# Patient Record
Sex: FEMALE | Race: BLACK OR AFRICAN AMERICAN
[De-identification: names, ages, dates, MRNs, and addresses within clinical notes are randomized per-mention and may not be internally consistent; named-entity substitution may affect disease eponyms.]

---

## 2017-08-30 ENCOUNTER — HOSPITAL ENCOUNTER (EMERGENCY)
Dept: HOSPITAL 62 - ER | Age: 42
Discharge: HOME | End: 2017-08-30
Payer: SELF-PAY

## 2017-08-30 DIAGNOSIS — R10.30: ICD-10-CM

## 2017-08-30 DIAGNOSIS — Z3A.01: ICD-10-CM

## 2017-08-30 DIAGNOSIS — F17.200: ICD-10-CM

## 2017-08-30 DIAGNOSIS — O46.91: Primary | ICD-10-CM

## 2017-08-30 LAB
ALBUMIN SERPL-MCNC: 4.1 G/DL (ref 3.5–5)
ALP SERPL-CCNC: 68 U/L (ref 38–126)
ALT SERPL-CCNC: 25 U/L (ref 9–52)
ANION GAP SERPL CALC-SCNC: 9 MMOL/L (ref 5–19)
APPEARANCE UR: (no result)
AST SERPL-CCNC: 18 U/L (ref 14–36)
BASOPHILS # BLD AUTO: 0.1 10^3/UL (ref 0–0.2)
BASOPHILS NFR BLD AUTO: 0.9 % (ref 0–2)
BILIRUB DIRECT SERPL-MCNC: 0.3 MG/DL (ref 0–0.4)
BILIRUB SERPL-MCNC: 0.5 MG/DL (ref 0.2–1.3)
BILIRUB UR QL STRIP: NEGATIVE
BUN SERPL-MCNC: 11 MG/DL (ref 7–20)
CALCIUM: 10.3 MG/DL (ref 8.4–10.2)
CHLORIDE SERPL-SCNC: 104 MMOL/L (ref 98–107)
CO2 SERPL-SCNC: 25 MMOL/L (ref 22–30)
CREAT SERPL-MCNC: 0.98 MG/DL (ref 0.52–1.25)
EOSINOPHIL # BLD AUTO: 0.2 10^3/UL (ref 0–0.6)
EOSINOPHIL NFR BLD AUTO: 3 % (ref 0–6)
ERYTHROCYTE [DISTWIDTH] IN BLOOD BY AUTOMATED COUNT: 15.1 % (ref 11.5–14)
GLUCOSE SERPL-MCNC: 82 MG/DL (ref 75–110)
GLUCOSE UR STRIP-MCNC: NEGATIVE MG/DL
HCT VFR BLD CALC: 38.6 % (ref 36–47)
HGB BLD-MCNC: 12.8 G/DL (ref 12–15.5)
HGB HCT DIFFERENCE: -0.2
KETONES UR STRIP-MCNC: NEGATIVE MG/DL
LYMPHOCYTES # BLD AUTO: 1.9 10^3/UL (ref 0.5–4.7)
LYMPHOCYTES NFR BLD AUTO: 28.7 % (ref 13–45)
MCH RBC QN AUTO: 28.3 PG (ref 27–33.4)
MCHC RBC AUTO-ENTMCNC: 33.1 G/DL (ref 32–36)
MCV RBC AUTO: 86 FL (ref 80–97)
MONOCYTES # BLD AUTO: 0.5 10^3/UL (ref 0.1–1.4)
MONOCYTES NFR BLD AUTO: 7.6 % (ref 3–13)
NEUTROPHILS # BLD AUTO: 3.9 10^3/UL (ref 1.7–8.2)
NEUTS SEG NFR BLD AUTO: 59.8 % (ref 42–78)
NITRITE UR QL STRIP: NEGATIVE
PH UR STRIP: 5 [PH] (ref 5–9)
POTASSIUM SERPL-SCNC: 4.8 MMOL/L (ref 3.6–5)
PROT SERPL-MCNC: 6.8 G/DL (ref 6.3–8.2)
PROT UR STRIP-MCNC: 30 MG/DL
RBC # BLD AUTO: 4.51 10^6/UL (ref 3.72–5.28)
SODIUM SERPL-SCNC: 138.2 MMOL/L (ref 137–145)
SP GR UR STRIP: 1.01
UROBILINOGEN UR-MCNC: NEGATIVE MG/DL (ref ?–2)
WBC # BLD AUTO: 6.5 10^3/UL (ref 4–10.5)

## 2017-08-30 PROCEDURE — 86900 BLOOD TYPING SEROLOGIC ABO: CPT

## 2017-08-30 PROCEDURE — 76817 TRANSVAGINAL US OBSTETRIC: CPT

## 2017-08-30 PROCEDURE — 81001 URINALYSIS AUTO W/SCOPE: CPT

## 2017-08-30 PROCEDURE — 84702 CHORIONIC GONADOTROPIN TEST: CPT

## 2017-08-30 PROCEDURE — 36415 COLL VENOUS BLD VENIPUNCTURE: CPT

## 2017-08-30 PROCEDURE — 99284 EMERGENCY DEPT VISIT MOD MDM: CPT

## 2017-08-30 PROCEDURE — 85025 COMPLETE CBC W/AUTO DIFF WBC: CPT

## 2017-08-30 PROCEDURE — 80053 COMPREHEN METABOLIC PANEL: CPT

## 2017-08-30 PROCEDURE — 86901 BLOOD TYPING SEROLOGIC RH(D): CPT

## 2017-08-30 NOTE — ER DOCUMENT REPORT
ED General





- General


Chief Complaint: Vag Bleeding, +preg <12wks


Stated Complaint: VAGINAL BLEEDING


Time Seen by Provider: 17 19:07


Notes: 





Patient is a 41-year-old female with a past medical history  who presents 

with mild lower abdominal cramping and vaginal spotting.  Patient states her 

last menstrual period was at the end of July and she believes she is 

approximately 9 weeks pregnant.  She has not had a ultrasound during this 

pregnancy.  She did have an elective  during her first pregnancy when 

she was 18 years of age and has not been pregnant since that time.  She does 

note a very mild, dull, intermittent, cramping pain to her lower abdomen.  

Nothing improves or worsens his pain.  She has not seen her primary doctor 

regarding today's concerns.  No prior history of these symptoms in this 

pregnancy.  She has not bled through a single pad since she started bleeding 

today.


TRAVEL OUTSIDE OF THE [place] IN LAST 30 DAYS: No





- Related Data


Allergies/Adverse Reactions: 


 





No Known Allergies Allergy (Verified 17 19:01)


 











Past Medical History





- General


Information source: Patient





- Social History


Smoking Status: Current Every Day Smoker


Frequency of alcohol use: None


Drug Abuse: None


Family History: Reviewed & Not Pertinent


Patient has suicidal ideation: No


Patient has homicidal ideation: No


Renal/ Medical History: Denies: Hx Peritoneal Dialysis





- Immunizations


Hx Diphtheria, Pertussis, Tetanus Vaccination: Yes





Review of Systems





- Review of Systems


Notes: 





Constitutional: Negative for fever.


HENT: Negative for sore throat.


Eyes: Negative for visual changes.


Cardiovascular: Negative for chest pain.


Respiratory: Negative for shortness of breath.


Gastrointestinal: positive for mild abdominal cramping


Genitourinary: Negative for dysuria.  Positive for mild vaginal bleeding


Musculoskeletal: Negative for back pain.


Skin: Negative for rash.


Neurological: Negative for headaches, weakness or numbness.





10 point ROS negative except as marked above and in HPI.





Physical Exam





- Vital signs


Vitals: 


 











Temp Pulse Resp BP Pulse Ox


 


 99.2 F   67   20   137/88 H  100 


 


 17 18:58  17 18:58  17 18:58  17 18:58  17 18:58











Interpretation: Normal


Notes: 





PHYSICAL EXAMINATION:





GENERAL: Well-appearing, well-nourished and in no acute distress.





HEAD: Atraumatic, normocephalic.





EYES: Pupils equal round and reactive to light, extraocular movements intact, 

sclera anicteric, conjunctiva are normal.





ENT: nares patent, oropharynx clear without exudates.  Moist mucous membranes.





NECK: Normal range of motion, supple without lymphadenopathy





LUNGS: Breath sounds clear to auscultation bilaterally and equal.  No wheezes 

rales or rhonchi.





HEART: Regular rate and rhythm without murmurs





ABDOMEN: Soft, nontender, normoactive bowel sounds.  No guarding, no rebound.  

No masses appreciated.





EXTREMITIES: Normal range of motion, no pitting or edema.  No cyanosis.





NEUROLOGICAL: No focal neurological deficits. Moves all extremities 

spontaneously and on command.





PSYCH: Normal mood, normal affect.





SKIN: Warm, Dry, normal turgor, no rashes or lesions noted.





Course





- Re-evaluation


Re-evalutation: 





17 22:40


Patient presents with a mild amount of vaginal bleeding in the setting of an 

early first trimester pregnancy.  Transvaginal ultrasound is unable to 

visualize an intrauterine pregnancy at this time.  Quantitative beta hCG below 

the zone of to margination.  No active bleeding at time of presentation.  She 

is Rh positive.  Patient's abdominal exam is otherwise benign without any focal 

tenderness.  I do not suspect an acute appendicitis, pyelonephritis, cystitis, 

or bowel obstruction.  At this time I have informed the patient that she needs 

to return to the emergency department or the women's clinic in 48 hours for 

recheck of her quantitative beta hCG to assess whether or not this is a normal 

pregnancy, spontaneous , or a possible ectopic pregnancy. At this time 

will discharge with return precautions and follow-up recommendations.  Verbal 

discharge instructions given a the bedside and opportunity for questions given. 

Medication warnings reviewed. Patient is in agreement with this plan and has 

verbalized understanding of return precautions and the need for primary care 

follow-up in the next 24-72 hours.








- Vital Signs


Vital signs: 


 











Temp Pulse Resp BP Pulse Ox


 


 97.4 F   66   16   116/74   99 


 


 17 00:06  17 00:06  17 00:06  17 00:06  17 00:06














- Laboratory


Result Diagrams: 


 17 19:15





 17 19:15


Laboratory results interpreted by me: 


 











  17





  19:15 19:15 19:15


 


RDW  15.1 H  


 


Calcium   10.3 H 


 


Beta HCG, Quant   81.47 H 


 


Urine Protein    30 H


 


Urine Blood    LARGE H


 


Ur Leukocyte Esterase    MODERATE H


 


Urine Ascorbic Acid    20 H














- Diagnostic Test


Radiology reviewed: Reports reviewed





Discharge





- Discharge


Clinical Impression: 


 Pregnancy of unknown anatomic location, Hemorrhage, pregnancy, early





Condition: Good


Disposition: HOME, SELF-CARE


Additional Instructions: 


You need to return to the ED or the women's health clinic in 48 hours for a 

recheck of your pregnancy hormone level. The ultrasound is unable to see 

anything at this time because you are too early in your pregnancy. Please 

return if you develop severe abdominal pain, bleeding that goes through more 

than 2 pads for more than 2 hours, pass out, or have any other symptoms that 

are concerning to you. Please follow-up closely with your OBGYN regarding 

todays visit.


Forms:  Return to Work

## 2017-08-30 NOTE — RADIOLOGY REPORT (SQ)
EXAM DESCRIPTION:  U/S OB TRANSVAGINAL W/O DOP



COMPLETED DATE/TIME:  2017 9:17 pm



REASON FOR STUDY:  preg/bleeding



COMPARISON:  None.



TECHNIQUE:  Transabdominal static and realtime grayscale images acquired of the pelvis. Additional se
lected spectral and color Doppler images recorded. All images stored on PACs.

bHC



LIMITATIONS:  None.



FINDINGS:  No IUP visualized.

UTERUS: Multiple fibroids limit evaluation of the endometrium, largest in the fundus measuring 10 cm.


CERVICAL LENGTH: 3.4 cm   Closed.

RIGHT ADNEXA: Normal ovary with normal vascular flow.

No adnexal free fluid.

No adnexal masses.

LEFT ADNEXA: Ovary not identified.

No adnexal free fluid.

No adnexal masses.

FREE FLUID: None.

OTHER: No other significant finding.



IMPRESSION:  No IUP visualized.

Multiple fibroids limit evaluation of the endometrium, largest in the fundus measuring 10 cm.  Nonvis
ualized left ovary.



TECHNICAL DOCUMENTATION:  JOB ID:  [id number]

  [employer]- All Rights Reserved

## 2017-08-30 NOTE — ER DOCUMENT REPORT
ED Medical Screen (RME)





- General


Chief Complaint: Vag Bleeding, +preg <12wks


Stated Complaint: VAGINAL BLEEDING


Time Seen by Provider: 17 19:07


Notes: 





Patient states she is approximately 9 weeks pregnant.  She had a positive 

pregnancy test at the local health clinic.  She has not had an ultrasound yet.  

She states today she started with some spotting this morning and throughout the 

day she has had increased bleeding and cramping.  No clots.  She is a .


TRAVEL OUTSIDE OF THE [place] IN LAST 30 DAYS: No





- Related Data


Allergies/Adverse Reactions: 


 





No Known Allergies Allergy (Verified 17 19:01)


 











Past Medical History





- Social History


Frequency of alcohol use: None


Drug Abuse: None


Renal/ Medical History: Denies: Hx Peritoneal Dialysis





- Immunizations


Hx Diphtheria, Pertussis, Tetanus Vaccination: Yes





Physical Exam





- Vital signs


Vitals: 





 











Temp Pulse Resp BP Pulse Ox


 


 99.2 F   67   20   137/88 H  100 


 


 17 18:58  17 18:58  17 18:58  17 18:58  17 18:58














Course





- Vital Signs


Vital signs: 





 











Temp Pulse Resp BP Pulse Ox


 


 99.2 F   67   20   137/88 H  100 


 


 17 18:58  17 18:58  17 18:58  17 18:58  17 18:58

## 2017-08-31 VITALS — SYSTOLIC BLOOD PRESSURE: 116 MMHG | DIASTOLIC BLOOD PRESSURE: 74 MMHG

## 2018-08-23 ENCOUNTER — HOSPITAL ENCOUNTER (EMERGENCY)
Dept: HOSPITAL 62 - ER | Age: 43
Discharge: LEFT BEFORE DISCHARGE | End: 2018-08-23
Payer: MEDICAID

## 2018-08-23 VITALS — SYSTOLIC BLOOD PRESSURE: 136 MMHG | DIASTOLIC BLOOD PRESSURE: 71 MMHG

## 2018-08-23 DIAGNOSIS — Z53.21: Primary | ICD-10-CM

## 2018-08-24 ENCOUNTER — HOSPITAL ENCOUNTER (EMERGENCY)
Dept: HOSPITAL 62 - ER | Age: 43
Discharge: HOME | End: 2018-08-24
Payer: MEDICAID

## 2018-08-24 VITALS — DIASTOLIC BLOOD PRESSURE: 81 MMHG | SYSTOLIC BLOOD PRESSURE: 128 MMHG

## 2018-08-24 DIAGNOSIS — O34.12: Primary | ICD-10-CM

## 2018-08-24 DIAGNOSIS — D25.9: ICD-10-CM

## 2018-08-24 DIAGNOSIS — O26.892: ICD-10-CM

## 2018-08-24 DIAGNOSIS — R11.0: ICD-10-CM

## 2018-08-24 DIAGNOSIS — Z3A.17: ICD-10-CM

## 2018-08-24 DIAGNOSIS — O99.332: ICD-10-CM

## 2018-08-24 DIAGNOSIS — R10.31: ICD-10-CM

## 2018-08-24 LAB
ADD MANUAL DIFF: NO
ALBUMIN SERPL-MCNC: 3.8 G/DL (ref 3.5–5)
ALP SERPL-CCNC: 84 U/L (ref 38–126)
ALT SERPL-CCNC: 26 U/L (ref 9–52)
ANION GAP SERPL CALC-SCNC: 8 MMOL/L (ref 5–19)
APPEARANCE UR: (no result)
APTT PPP: YELLOW S
AST SERPL-CCNC: 39 U/L (ref 14–36)
BASOPHILS # BLD AUTO: 0.1 10^3/UL (ref 0–0.2)
BASOPHILS NFR BLD AUTO: 0.5 % (ref 0–2)
BILIRUB DIRECT SERPL-MCNC: 0.3 MG/DL (ref 0–0.4)
BILIRUB SERPL-MCNC: 0.5 MG/DL (ref 0.2–1.3)
BILIRUB UR QL STRIP: NEGATIVE
BUN SERPL-MCNC: 8 MG/DL (ref 7–20)
CALCIUM: 10.9 MG/DL (ref 8.4–10.2)
CHLORIDE SERPL-SCNC: 105 MMOL/L (ref 98–107)
CO2 SERPL-SCNC: 24 MMOL/L (ref 22–30)
EOSINOPHIL # BLD AUTO: 0.1 10^3/UL (ref 0–0.6)
EOSINOPHIL NFR BLD AUTO: 0.9 % (ref 0–6)
ERYTHROCYTE [DISTWIDTH] IN BLOOD BY AUTOMATED COUNT: 15 % (ref 11.5–14)
GLUCOSE SERPL-MCNC: 90 MG/DL (ref 75–110)
GLUCOSE UR STRIP-MCNC: NEGATIVE MG/DL
HCT VFR BLD CALC: 32.8 % (ref 36–47)
HGB BLD-MCNC: 11.1 G/DL (ref 12–15.5)
KETONES UR STRIP-MCNC: 20 MG/DL
LIPASE SERPL-CCNC: 35.3 U/L (ref 23–300)
LYMPHOCYTES # BLD AUTO: 1.4 10^3/UL (ref 0.5–4.7)
LYMPHOCYTES NFR BLD AUTO: 11.8 % (ref 13–45)
MCH RBC QN AUTO: 27.9 PG (ref 27–33.4)
MCHC RBC AUTO-ENTMCNC: 33.9 G/DL (ref 32–36)
MCV RBC AUTO: 82 FL (ref 80–97)
MONOCYTES # BLD AUTO: 0.7 10^3/UL (ref 0.1–1.4)
MONOCYTES NFR BLD AUTO: 5.9 % (ref 3–13)
NEUTROPHILS # BLD AUTO: 9.5 10^3/UL (ref 1.7–8.2)
NEUTS SEG NFR BLD AUTO: 80.9 % (ref 42–78)
NITRITE UR QL STRIP: NEGATIVE
PH UR STRIP: 6 [PH] (ref 5–9)
PLATELET # BLD: 393 10^3/UL (ref 150–450)
POTASSIUM SERPL-SCNC: 4.3 MMOL/L (ref 3.6–5)
PROT SERPL-MCNC: 7.9 G/DL (ref 6.3–8.2)
PROT UR STRIP-MCNC: NEGATIVE MG/DL
RBC # BLD AUTO: 3.98 10^6/UL (ref 3.72–5.28)
SODIUM SERPL-SCNC: 136.7 MMOL/L (ref 137–145)
SP GR UR STRIP: 1.01
TOTAL CELLS COUNTED % (AUTO): 100 %
UROBILINOGEN UR-MCNC: NEGATIVE MG/DL (ref ?–2)
WBC # BLD AUTO: 11.7 10^3/UL (ref 4–10.5)

## 2018-08-24 PROCEDURE — 74181 MRI ABDOMEN W/O CONTRAST: CPT

## 2018-08-24 PROCEDURE — 80053 COMPREHEN METABOLIC PANEL: CPT

## 2018-08-24 PROCEDURE — 86901 BLOOD TYPING SEROLOGIC RH(D): CPT

## 2018-08-24 PROCEDURE — 81001 URINALYSIS AUTO W/SCOPE: CPT

## 2018-08-24 PROCEDURE — 96361 HYDRATE IV INFUSION ADD-ON: CPT

## 2018-08-24 PROCEDURE — 96374 THER/PROPH/DIAG INJ IV PUSH: CPT

## 2018-08-24 PROCEDURE — 76805 OB US >/= 14 WKS SNGL FETUS: CPT

## 2018-08-24 PROCEDURE — 99285 EMERGENCY DEPT VISIT HI MDM: CPT

## 2018-08-24 PROCEDURE — 86900 BLOOD TYPING SEROLOGIC ABO: CPT

## 2018-08-24 PROCEDURE — 36415 COLL VENOUS BLD VENIPUNCTURE: CPT

## 2018-08-24 PROCEDURE — 96375 TX/PRO/DX INJ NEW DRUG ADDON: CPT

## 2018-08-24 PROCEDURE — 84702 CHORIONIC GONADOTROPIN TEST: CPT

## 2018-08-24 PROCEDURE — 85025 COMPLETE CBC W/AUTO DIFF WBC: CPT

## 2018-08-24 PROCEDURE — 83690 ASSAY OF LIPASE: CPT

## 2018-08-24 PROCEDURE — 87086 URINE CULTURE/COLONY COUNT: CPT

## 2018-08-24 PROCEDURE — 76770 US EXAM ABDO BACK WALL COMP: CPT

## 2018-08-24 NOTE — RADIOLOGY REPORT (SQ)
EXAM DESCRIPTION:  MRI ABDOMEN WITHOUT



COMPLETED DATE/TIME:  8/24/2018 8:44 am



REASON FOR STUDY:  RLQ pain, nuasea



COMPARISON:  None.



TECHNIQUE:  T1, T1 fat sat, T2 fat sat and gradient echo weighted sequences through the abdomen and p
roro without contrast.  Images saved to PACs.



FINDINGS:  There is motion artifact.  Patient is 17 weeks pregnant.  There is a fundal fibroid measur
ing about 5 cm.  There is a much larger heterogeneous mass extending from the fundus to the subhepati
c space, measuring roughly 11.0 x 20 by 18 cm AP by transverse by craniocaudal diameter.  On series 8
, images 8 to 10, contiguous vessels between the pedunculated mass and the fundus.  Intermediate dark
 on T1 and T2 with central areas of high T2 signal consistent with degenerating fibroid that has outg
rown its blood supply.

Small amount of ascites.  Moderate free fluid in the pelvis.  The appendix is not visualized.



IMPRESSION:  Large pedunculated fibroid occupying most of the mid abdomen which will interfere with c
arrying fetus to term.  OB gyn consultation is recommended.



TECHNICAL DOCUMENTATION:  JOB ID:  [id number]

 2011 [employer]- All Rights Reserved



Reading location - IP/workstation name: [email or web address]

## 2018-08-24 NOTE — RADIOLOGY REPORT (SQ)
CLINICAL DATA:



42-year-old female with positive pregnancy and pain.



TECHNICAL DATA:



Transabdominal ultrasound imaging was performed through the

gravid uterus.



FINDINGS:



ANATOMIC EVALUATION

FETUS         single

POSITION                transverse lie

HEART RATE       not obtained at this time

AMNIOTIC FLUID     4.7 cm largest vertical pocket

PLACENTA LOCATION   fundal

PREVIA                no evidence of placenta previa



ANATOMY IDENTIFIED: There is a large heterogeneous complex fundal

fibroid measuring approximately 13.3 x 17.5 x 12.2 cm. This

measured approximately 10.3 x 7.1 x 9.1 cm on prior ultrasound

performed on 8/30/2017. Evaluation of the anatomy is limited due

to the early gestational age of the fetus. The visualized

structures include a three-vessel cord, normal cord insertion,

bladder, stomach and upper and lower extremities. The maternal

ovaries are not well visualized on this examination.   





MEASUREMENTS      

BPD:     3.3 centimeters corresponding to 16 weeks, two days. 

HC:     13.49 centimeters corresponding to 17 weeks, 0 days. 

AC:     12.13 centimeters corresponding to 17 weeks, six days.   

             

FL:     2.41 centimeters corresponding to 17 weeks, two days.    

           

CER:     3.2 cm in length         



FL/AC:        19.9     

FL/BPD:   72.4    

HC/AC:   1.11   

CI:     90.7    

EFW:          196  grams +/-   29 grams



CLINICAL DATES

LMP     4/29/2018

[place]     16 weeks five days

EDC     2/3/2019



ESTIMATED DATES BY ULTRASOUND

[place]   17 weeks one day 

EDC     1/31/2019





IMPRESSION:



1. Single intrauterine pregnancy with an estimated ultrasound age

of 17 weeks, one day with an estimated due date of 1/31/2019.

This corresponds to within three days of the expected clinical

gestational age. At the time of this study, the fetal heart rate

was not evaluated.

2. The placenta is fundal in location without evidence of

placenta previa.

3. Large heterogeneous complex fundal fibroid with a maximum

dimension of 17.5 cm.

4. The fetal anatomic survey is incomplete at this time.

5. The cephalic index is elevated.

6. The largest vertical amniotic fluid pocket measures 4.7 cm.

## 2018-08-24 NOTE — RADIOLOGY REPORT (SQ)
EXAM DESCRIPTION: 



Renal ultrasound



CLINICAL HISTORY: 



42 years, Female, RLQ pain



COMPARISON: 



None



TECHNIQUE: 



Utilizing a curved array transducer, real-time ultrasound

evaluation of the bilateral kidneys and urinary bladder was

performed. Color Doppler imaging was used assess vascular flow.



FINDINGS: 



The right kidney measures 11.0 x 6.1 x 5.1 cm with cortical

thickness of 1.2 . There is mild right-sided hydronephrosis.

There are no shadowing right renal calculi. There are no grossly

abnormal right renal masses.

There is normal echogenicity of the right kidney relative to the

adjacent liver. 

The left kidney measures 10.1 x 5.2 x 4.6 cm with cortical

thickness of 1.2. There is mild left-sided hydronephrosis. There

are no shadowing left renal calculi.. There are no grossly

abnormal masses identified.

There is normal echogenicity of the left kidney relative to the

adjacent spleen. 



The bladder is normal in appearance.

There is ureteral jets well-visualized and normal

Patient's gravid uterus is not well-visualized on this exam.



IMPRESSION: 



1. Mild bilateral hydronephrosis without shadowing renal calculi.

2. Otherwise unremarkable bilateral kidneys.

## 2018-08-24 NOTE — ER DOCUMENT REPORT
ED GI/





- General


Mode of Arrival: Ambulatory


Information source: Patient


TRAVEL OUTSIDE OF THE [place] IN LAST 30 DAYS: No





<SHARATH CASTANEDA - Last Filed: 08/24/18 05:28>





<EFREN PEREZ - Last Filed: 08/24/18 06:28>





<MARCELO MORRISON - Last Filed: 08/24/18 13:35>





- General


Chief Complaint: Abdominal Pain


Stated Complaint: ABDOMINAL PAIN


Time Seen by Provider: 08/24/18 03:21


Notes: 





42-year-old female who presents to the emergency department today with 

complaints of right lower quadrant abdominal pain.  Patient was seen here 

earlier and left without being seen because her pain went away.  Patient states 

she "did not want to waste money because she thought it might have just been a 

gas bubble".  Patient states she has chills but denies any nausea, vomiting, 

diarrhea, or urinary symptoms. Patient is 16 weeks pregnant. (SHARATH CASTANEDA)





- Related Data


Allergies/Adverse Reactions: 


 





No Known Allergies Allergy (Verified 08/30/17 19:01)


 











Past Medical History





- General


Information source: Patient





- Social History


Smoking Status: Unknown if Ever Smoked


Cigarette use (# per day): No


Frequency of alcohol use: None


Drug Abuse: None


Lives with: Family


Family History: Reviewed & Not Pertinent


Patient has suicidal ideation: No


Patient has homicidal ideation: No


Renal/ Medical History: Denies: Hx Peritoneal Dialysis





- Immunizations


Hx Diphtheria, Pertussis, Tetanus Vaccination: Yes





<SHARATH CASTANEDA - Last Filed: 08/24/18 05:28>





Review of Systems





- Review of Systems


Constitutional: See HPI, Chills.  denies: Fever


EENT: No symptoms reported


Cardiovascular: No symptoms reported


Respiratory: No symptoms reported


Gastrointestinal: See HPI, Abdominal pain.  denies: Diarrhea, Nausea, Vomiting


Genitourinary: No symptoms reported


Female Genitourinary: See HPI, Pregnant


Musculoskeletal: No symptoms reported


Skin: No symptoms reported


Hematologic/Lymphatic: No symptoms reported


Neurological/Psychological: No symptoms reported


-: Yes All other systems reviewed and negative





<SHARATH CASTANEDA - Last Filed: 08/24/18 05:28>





Physical Exam





- Vital signs


Interpretation: Normal





- General


General appearance: Appears well, Alert





- HEENT


Head: Normocephalic, Atraumatic


Eyes: Normal


Pupils: PERRL


Mucous membranes: Dry


Pharynx: Normal





- Respiratory


Respiratory status: No respiratory distress


Chest status: Nontender


Breath sounds: Normal


Chest palpation: Normal





- Cardiovascular


Rhythm: Regular


Heart sounds: Normal auscultation


Murmur: No





- Abdominal


Inspection: Gravid female


Distension: No distension


Bowel sounds: Normal


Tenderness: Tender - RLQ


Organomegaly: No organomegaly





- Back


Back: Normal, Nontender





- Extremities


General upper extremity: Normal inspection, Nontender, Normal color, Normal ROM

, Normal temperature


General lower extremity: Normal inspection, Nontender, Normal color, Normal ROM

, Normal temperature, Normal weight bearing.  No: Debora's sign





- Neurological


Neuro grossly intact: Yes


Cognition: Normal


Orientation: AAOx4


[place] Coma Scale Eye Opening: Spontaneous


[place] Coma Scale Verbal: Oriented


[place] Coma Scale Motor: Obeys Commands


[place] Coma Scale Total: 15


Speech: Normal


Motor strength normal: LUE, RUE, LLE, RLE


Sensory: Normal





- Psychological


Associated symptoms: Normal affect, Normal mood





- Skin


Skin Temperature: Warm


Skin Moisture: Dry


Skin Color: Normal





<EFREN PEREZ - Last Filed: 08/24/18 06:28>





- Vital signs


Vitals: 


 











Temp Pulse Resp BP Pulse Ox


 


 99 F   89   20   131/73 H  96 


 


 08/24/18 02:11  08/24/18 02:11  08/24/18 02:11  08/24/18 02:11  08/24/18 02:11














Course





- Laboratory


Result Diagrams: 


 08/24/18 03:41





 08/24/18 03:41





<SHARATH CASTANEDA - Last Filed: 08/24/18 05:28>





- Laboratory


Result Diagrams: 


 08/24/18 03:41





 08/24/18 03:41





- Diagnostic Test


Radiology reviewed: Reports reviewed





<EFREN PEREZ - Last Filed: 08/24/18 06:28>





- Laboratory


Result Diagrams: 


 08/24/18 03:41





 08/24/18 03:41





- Diagnostic Test


Radiology reviewed: Reports reviewed - MRI shows a 10 x 20 cm pedunculated 

fibroid and a 5 cm fibroid.





- Consults


  ** Dr. Heredia


Time consulted: 11:50


Consulted provider: will come to ER





<MARCELO MORRISON - Last Filed: 08/24/18 13:35>





- Re-evaluation


Re-evalutation: 





08/24/18 06:28


Patient is a 42-year-old pregnant female at 17 weeks 3 days who comes in 

complaining of right lower quadrant pain.  She has a large fibroid in her 

uterus which could possibly be the cause of her pain.  Unfortunately, she has 

had persistent right lower quadrant pain and some nausea.  MRI will be ordered 

and patient will be left in the care of Dr. Marcelo Morrison.  Patient agrees 

with this plan. (EFREN PEREZ)





- Vital Signs


Vital signs: 


 











Temp Pulse Resp BP Pulse Ox


 


 99 F   89   20   131/73 H  96 


 


 08/24/18 02:11  08/24/18 02:11  08/24/18 02:11  08/24/18 02:11  08/24/18 02:11














- Laboratory


Laboratory results interpreted by me: 


 











  08/24/18 08/24/18 08/24/18





  03:41 03:41 04:38


 


WBC  11.7 H  


 


Hgb  11.1 L  


 


Hct  32.8 L  


 


RDW  15.0 H  


 


Seg Neutrophils %  80.9 H  


 


Lymphocytes %  11.8 L  


 


Absolute Neutrophils  9.5 H  


 


Sodium   136.7 L 


 


Calcium   10.9 H 


 


AST   39 H 


 


Beta HCG, Quant   02556.00 H 


 


Urine Ketones    20 H


 


Ur Leukocyte Esterase    SMALL H


 


Urine Ascorbic Acid    20 H














Discharge





<SHARATH CASTANEDA - Last Filed: 08/24/18 05:28>





<EFREN PEREZ - Last Filed: 08/24/18 06:28>





<MARCELO MORRISON - Last Filed: 08/24/18 13:35>





- Discharge


Clinical Impression: 


 Uterine fibroid in pregnancy





Pregnancy


Qualifiers:


 Weeks of gestation: 16 weeks Qualified Code(s): Z3A.16 - 16 weeks gestation of 

pregnancy





Abdominal pain


Qualifiers:


 Abdominal location: right lower quadrant Qualified Code(s): R10.31 - Right 

lower quadrant pain





Condition: Stable


Disposition: HOME, SELF-CARE


Additional Instructions: 


You have a very large uterine fibroid this may be the source of your abdominal 

discomfort.


Take stool softeners and be sure to drink plenty of fluids.


Follow-up with [facility] in the office next week.





RETURN TO THE EMERGENCY ROOM IF ANY NEW OR WORSENING SYMPTOMS.








Referrals: 


FAYE CHAPARRO MD [Primary Care Provider] - Follow up in 3-5 days


Scribe Attestation: 





08/24/18 06:29


I personally performed the services described in the documentation, reviewed 

and edited the documentation which was dictated to the scribe in my presence, 

and it accurately records my words and actions. (EFREN PEREZ)





Scribe Documentation





- Scribe


Written by Scribe:: Mine Mtaamoros, 8/24/2018 0532


acting as scribe for :: Charbel





<SHARATH CASTANEDA - Last Filed: 08/24/18 05:28>

## 2018-08-24 NOTE — PDOC CONSULTATION
Consultation


Consult Date: 08/24/18


Attending physician:: MACARENA MORRISON


Consult reason:: Abdominal pain





History of Present Illness


Admission Date/PCP: 


  





  FAYE CHAPARRO MD





History of Present Illness: 


EUSEBIO FLOWERS is a 42 year old female


Patient presents emergency department via ground rescue complaining of acute 

onset abdominal pain last night while working.  Patient denies history of trauma

, previous episode.  On further questioning, the patient noticed her abdominal 

wall was becoming nino.  She has a remote history of uterine fibroids.  The 

patient is noted to be pregnant, perhaps 14-16 weeks.  [facility]  She does not know her gynecologist is.  She was seen in the 

emergency department where she underwent to have right lower quadrant fullness, 

tenderness, leukocytosis.  She subsequently underwent an ultrasound which 

identified a very large abdominal mass; this was followed up by a MRI of the 

abdomen which confirmed a very large 20+ cm intra-abdominal mass, and 

associated uterine fibroid, and an anterior uterine pregnancy.  Surgery and OB/

GYN  were consulted.  Patient denies history of nausea vomiting diarrhea or 

constipation.





Past Medical History


Past Medical History: 





Hypertension, smoking, COPD





Social History


Lives with: Family


Smoking Status: Current Every Day Smoker





Family History


Family History: Reviewed & Not Pertinent


Parental Family History Reviewed: Yes


Children Family History Reviewed: Yes


Sibling(s) Family History Reviewed.: Yes





Medication/Allergy


Home Medications: 








Ondansetron [Zofran Odt 4 mg Tablet] 1 - 2 tab PO Q4H PRN #15 tab.rapdis 05/26/

15 








Allergies/Adverse Reactions: 


 





No Known Allergies Allergy (Verified 08/30/17 19:01)


 











Review of Systems


Constitutional: PRESENT: as per HPI


Eyes: ABSENT: visual disturbances


Ears: ABSENT: hearing changes


Cardiovascular: ABSENT: chest pain, dyspnea on exertion, edema, orthropnea, 

palpitations


Gastrointestinal: ABSENT: abdominal pain, constipation, diarrhea, hematemesis, 

hematochezia, nausea, vomiting


Psychiatric: PRESENT: other - None





Physical Exam


Vital Signs: 


 











Temp Pulse Resp BP Pulse Ox


 


 99 F   89   20   131/73 H  96 


 


 08/24/18 02:11  08/24/18 02:11  08/24/18 02:11  08/24/18 02:11  08/24/18 02:11








 Intake & Output











 08/23/18 08/24/18 08/25/18





 06:59 06:59 06:59


 


Intake Total  500 


 


Balance  500 


 


Weight  88.5 kg 











General appearance: PRESENT: no acute distress


Eye exam: PRESENT: EOMI


Mouth exam: PRESENT: dry mucosa


Neck exam: PRESENT: full ROM


Respiratory exam: PRESENT: wheezes


Cardiovascular exam: PRESENT: RRR


Pulses: PRESENT: normal carotid pulses


GI/Abdominal exam: PRESENT: other - The abdomen is grossly deformed with a 

visible and readily palpable right mid abdominal mass with firmness and 

tenderness.  Peripheral to the mass the abdominal wall is softer.


Rectal exam: PRESENT: deferred


Extremities exam: PRESENT: full ROM


Musculoskeletal exam: PRESENT: full ROM


Neurological exam: PRESENT: alert, oriented to person, oriented to place, 

oriented to time, oriented to situation


Psychiatric exam: PRESENT: appropriate affect





Results


Laboratory Results: 


 





 08/24/18 03:41 





 08/24/18 03:41 





 











  08/24/18 08/24/18 08/24/18





  03:41 03:41 04:34


 


WBC  11.7 H  


 


RBC  3.98  


 


Hgb  11.1 L  


 


Hct  32.8 L  


 


MCV  82  


 


MCH  27.9  


 


MCHC  33.9  


 


RDW  15.0 H  


 


Plt Count  393  


 


Seg Neutrophils %  80.9 H  


 


Lymphocytes %  11.8 L  


 


Monocytes %  5.9  


 


Eosinophils %  0.9  


 


Basophils %  0.5  


 


Absolute Neutrophils  9.5 H  


 


Absolute Lymphocytes  1.4  


 


Absolute Monocytes  0.7  


 


Absolute Eosinophils  0.1  


 


Absolute Basophils  0.1  


 


Sodium   136.7 L 


 


Potassium   4.3 


 


Chloride   105 


 


Carbon Dioxide   24 


 


Anion Gap   8 


 


BUN   8 


 


Creatinine   0.57 


 


Est GFR ( Amer)   > 60 


 


Est GFR (Non-Af Amer)   > 60 


 


Glucose   90 


 


Calcium   10.9 H 


 


Total Bilirubin   0.5 


 


AST   39 H 


 


ALT   26 


 


Alkaline Phosphatase   84 


 


Total Protein   7.9 


 


Albumin   3.8 


 


Lipase   35.3 


 


Urine Color   


 


Urine Appearance   


 


Urine pH   


 


Ur Specific Gravity   


 


Urine Protein   


 


Urine Glucose (UA)   


 


Urine Ketones   


 


Urine Blood   


 


Urine Nitrite   


 


Ur Leukocyte Esterase   


 


Urine WBC (Auto)   


 


Urine RBC (Auto)   


 


Blood Type    O POSITIVE














  08/24/18





  04:38


 


WBC 


 


RBC 


 


Hgb 


 


Hct 


 


MCV 


 


MCH 


 


MCHC 


 


RDW 


 


Plt Count 


 


Seg Neutrophils % 


 


Lymphocytes % 


 


Monocytes % 


 


Eosinophils % 


 


Basophils % 


 


Absolute Neutrophils 


 


Absolute Lymphocytes 


 


Absolute Monocytes 


 


Absolute Eosinophils 


 


Absolute Basophils 


 


Sodium 


 


Potassium 


 


Chloride 


 


Carbon Dioxide 


 


Anion Gap 


 


BUN 


 


Creatinine 


 


Est GFR ( Amer) 


 


Est GFR (Non-Af Amer) 


 


Glucose 


 


Calcium 


 


Total Bilirubin 


 


AST 


 


ALT 


 


Alkaline Phosphatase 


 


Total Protein 


 


Albumin 


 


Lipase 


 


Urine Color  YELLOW


 


Urine Appearance  SLIGHTLY-CLOUDY


 


Urine pH  6.0


 


Ur Specific Gravity  1.009


 


Urine Protein  NEGATIVE


 


Urine Glucose (UA)  NEGATIVE


 


Urine Ketones  20 H


 


Urine Blood  NEGATIVE


 


Urine Nitrite  NEGATIVE


 


Ur Leukocyte Esterase  SMALL H


 


Urine WBC (Auto)  22


 


Urine RBC (Auto)  3


 


Blood Type 











Impressions: 


 





Obstetrics Ultrasound  08/24/18 03:24


IMPRESSION:


 


1. Single intrauterine pregnancy with an estimated ultrasound age


of 17 weeks, one day with an estimated due date of 1/31/2019.


This corresponds to within three days of the expected clinical


gestational age. At the time of this study, the fetal heart rate


was not evaluated.


2. The placenta is fundal in location without evidence of


placenta previa.


3. Large heterogeneous complex fundal fibroid with a maximum


dimension of 17.5 cm.


4. The fetal anatomic survey is incomplete at this time.


5. The cephalic index is elevated.


6. The largest vertical amniotic fluid pocket measures 4.7 cm.


 








Renal Ultrasound  08/24/18 03:51


IMPRESSION: 


 


1. Mild bilateral hydronephrosis without shadowing renal calculi.


2. Otherwise unremarkable bilateral kidneys.


 


 


 








Abdomen MRI  08/24/18 06:12


IMPRESSION:  Large pedunculated fibroid occupying most of the mid abdomen which 

will interfere with carrying fetus to term.  OB gyn consultation is recommended.


 














Assessment & Plan





- Diagnosis


(1) Abdominal mass


Is this a current diagnosis for this admission?: Yes   


Plan: 


Impression: Expanding intra-abdominal mass with apparent areas of 

devascularization, most consistent with a massive uterine fibroid.  This 

impression was ambulating conjunction with the radiologist Dr. Gael Moreno.  

There appears to be no evidence of intra-abdominal gastrointestinal pathology.








Recommendations:











1.  Doubt this represents a gastrointestinal problem; general surgery will be 

available for reconsultation if clinically indicated











2.  Suggested OB/GYN consultation and opinion rendering.  Spoke with Dr. Heredia 

who will see patient this morning.








(2) Pregnancy


Qualifiers: 


   Weeks of gestation: 16 weeks   Qualified Code(s): Z3A.16 - 16 weeks 

gestation of pregnancy   


Is this a current diagnosis for this admission?: Yes   





(3) Uterine fibroid in pregnancy


Is this a current diagnosis for this admission?: Yes   





- Time


Time Spent: 30 to 50 Minutes


Smoking Cessation Education: 3 to 10 minutes


Anticipated discharge: Home





- Inpatient Certification


Based on my medical assessment, after consideration of the patient's 

comorbidities, presenting symptoms, or acuity I expect that the services needed 

warrant INPATIENT care.: Yes


I certify that my determination is in accordance with my understanding of 

Medicare's requirements for reasonable and necessary INPATIENT services [42 CFR 

412.3e].: Yes


Medical Necessity: Need For IV Fluids, Need for Pain Control

## 2018-09-10 ENCOUNTER — HOSPITAL ENCOUNTER (OUTPATIENT)
Dept: HOSPITAL 62 - ER | Age: 43
Setting detail: OBSERVATION
LOS: 1 days | Discharge: HOME | End: 2018-09-11
Attending: OBSTETRICS & GYNECOLOGY | Admitting: OBSTETRICS & GYNECOLOGY
Payer: MEDICAID

## 2018-09-10 DIAGNOSIS — O34.12: ICD-10-CM

## 2018-09-10 DIAGNOSIS — O99.012: ICD-10-CM

## 2018-09-10 DIAGNOSIS — O42.912: Primary | ICD-10-CM

## 2018-09-10 DIAGNOSIS — Z3A.19: ICD-10-CM

## 2018-09-10 DIAGNOSIS — O41.1220: ICD-10-CM

## 2018-09-10 LAB
ADD MANUAL DIFF: NO
ALBUMIN SERPL-MCNC: 3.2 G/DL (ref 3.5–5)
ALP SERPL-CCNC: 77 U/L (ref 38–126)
ALT SERPL-CCNC: 29 U/L (ref 9–52)
ANION GAP SERPL CALC-SCNC: 6 MMOL/L (ref 5–19)
AST SERPL-CCNC: 20 U/L (ref 14–36)
BASOPHILS # BLD AUTO: 0.1 10^3/UL (ref 0–0.2)
BASOPHILS NFR BLD AUTO: 0.6 % (ref 0–2)
BILIRUB DIRECT SERPL-MCNC: 0.3 MG/DL (ref 0–0.4)
BILIRUB SERPL-MCNC: 0.5 MG/DL (ref 0.2–1.3)
BUN SERPL-MCNC: 5 MG/DL (ref 7–20)
CALCIUM: 9.6 MG/DL (ref 8.4–10.2)
CHLORIDE SERPL-SCNC: 108 MMOL/L (ref 98–107)
CO2 SERPL-SCNC: 22 MMOL/L (ref 22–30)
EOSINOPHIL # BLD AUTO: 0.2 10^3/UL (ref 0–0.6)
EOSINOPHIL NFR BLD AUTO: 2.6 % (ref 0–6)
ERYTHROCYTE [DISTWIDTH] IN BLOOD BY AUTOMATED COUNT: 14.6 % (ref 11.5–14)
GLUCOSE SERPL-MCNC: 96 MG/DL (ref 75–110)
HCT VFR BLD CALC: 31.2 % (ref 36–47)
HGB BLD-MCNC: 10.5 G/DL (ref 12–15.5)
LYMPHOCYTES # BLD AUTO: 1.6 10^3/UL (ref 0.5–4.7)
LYMPHOCYTES NFR BLD AUTO: 16.8 % (ref 13–45)
MCH RBC QN AUTO: 28.3 PG (ref 27–33.4)
MCHC RBC AUTO-ENTMCNC: 33.6 G/DL (ref 32–36)
MCV RBC AUTO: 84 FL (ref 80–97)
MONOCYTES # BLD AUTO: 0.6 10^3/UL (ref 0.1–1.4)
MONOCYTES NFR BLD AUTO: 5.8 % (ref 3–13)
NEUTROPHILS # BLD AUTO: 7 10^3/UL (ref 1.7–8.2)
NEUTS SEG NFR BLD AUTO: 74.2 % (ref 42–78)
PLATELET # BLD: 417 10^3/UL (ref 150–450)
POTASSIUM SERPL-SCNC: 3.9 MMOL/L (ref 3.6–5)
PROT SERPL-MCNC: 6.3 G/DL (ref 6.3–8.2)
RBC # BLD AUTO: 3.71 10^6/UL (ref 3.72–5.28)
SODIUM SERPL-SCNC: 136.3 MMOL/L (ref 137–145)
TOTAL CELLS COUNTED % (AUTO): 100 %
WBC # BLD AUTO: 9.5 10^3/UL (ref 4–10.5)

## 2018-09-10 PROCEDURE — 99292 CRITICAL CARE ADDL 30 MIN: CPT

## 2018-09-10 PROCEDURE — 80053 COMPREHEN METABOLIC PANEL: CPT

## 2018-09-10 PROCEDURE — 85025 COMPLETE CBC W/AUTO DIFF WBC: CPT

## 2018-09-10 PROCEDURE — 86901 BLOOD TYPING SEROLOGIC RH(D): CPT

## 2018-09-10 PROCEDURE — 88300 SURGICAL PATH GROSS: CPT

## 2018-09-10 PROCEDURE — 86900 BLOOD TYPING SEROLOGIC ABO: CPT

## 2018-09-10 PROCEDURE — 96374 THER/PROPH/DIAG INJ IV PUSH: CPT

## 2018-09-10 PROCEDURE — 86920 COMPATIBILITY TEST SPIN: CPT

## 2018-09-10 PROCEDURE — 99291 CRITICAL CARE FIRST HOUR: CPT

## 2018-09-10 PROCEDURE — 96375 TX/PRO/DX INJ NEW DRUG ADDON: CPT

## 2018-09-10 PROCEDURE — 96376 TX/PRO/DX INJ SAME DRUG ADON: CPT

## 2018-09-10 PROCEDURE — 88305 TISSUE EXAM BY PATHOLOGIST: CPT

## 2018-09-10 PROCEDURE — 59414 DELIVER PLACENTA: CPT

## 2018-09-10 PROCEDURE — 86850 RBC ANTIBODY SCREEN: CPT

## 2018-09-10 PROCEDURE — 96361 HYDRATE IV INFUSION ADD-ON: CPT

## 2018-09-10 PROCEDURE — 36415 COLL VENOUS BLD VENIPUNCTURE: CPT

## 2018-09-10 RX ADMIN — SODIUM CHLORIDE, SODIUM LACTATE, POTASSIUM CHLORIDE, AND CALCIUM CHLORIDE PRN MLS/HR: .6; .31; .03; .02 INJECTION, SOLUTION INTRAVENOUS at 22:00

## 2018-09-10 NOTE — ER DOCUMENT REPORT
ED General





- General


Mode of Arrival: Ambulatory


Information source: Patient


TRAVEL OUTSIDE OF THE [place] IN LAST 30 DAYS: No





<ALFONSO QUESADA - Last Filed: 09/10/18 20:32>





<EFREN PEREZ - Last Filed: 18 05:07>





- General


Chief Complaint: Vaginal Bleeding


Stated Complaint: VAGINAL BLEEDING


Time Seen by Provider: 09/10/18 18:26


Notes: 


Patient is a 42 year old female currently approximately 18.5 weeks pregnant 

presents  to the emergency department via EMS complaining of a vaginal bleeding 

around 1715 today. Patient states her water broke at home and there was blood 

and fluid all over her bed and floor of bedroom. She describes the blood as 

bright red without clots. At bedside, patient states she feels like she is 

having contractions. 





Of significance, patient was seen and discharged in this emergency department 

on 2018 due to abdominal pain and was diagnosed with a large fibroid. 





Patient is . 





 (ALFONSO QUESADA)





- Related Data


Allergies/Adverse Reactions: 


 





No Known Allergies Allergy (Verified 17 19:01)


 











Past Medical History





- General


Information source: Patient





- Social History


Smoking Status: Unknown if Ever Smoked


Family History: Reviewed & Not Pertinent


Patient has suicidal ideation: No


Patient has homicidal ideation: No





- Immunizations


Hx Diphtheria, Pertussis, Tetanus Vaccination: Yes





<ALFONSO QUESADA - Last Filed: 09/10/18 20:32>





Review of Systems





- Review of Systems


Constitutional: No symptoms reported


EENT: No symptoms reported


Cardiovascular: No symptoms reported


Respiratory: No symptoms reported


Gastrointestinal: No symptoms reported


Genitourinary: No symptoms reported


Female Genitourinary: See HPI, Pregnant, Vaginal bleeding


Musculoskeletal: No symptoms reported


Skin: No symptoms reported


Hematologic/Lymphatic: No symptoms reported


Neurological/Psychological: No symptoms reported


-: Yes All other systems reviewed and negative





<ALFONSO QUESADA - Last Filed: 09/10/18 20:32>





Physical Exam





- Vital signs


Interpretation: Normal





- General


General appearance: Alert


In distress: Mild - Appears uncomfortable





- HEENT


Head: Normocephalic, Atraumatic


Eyes: Normal


Pupils: PERRL





- Respiratory


Respiratory status: No respiratory distress


Chest status: Nontender


Breath sounds: Normal


Chest palpation: Normal





- Cardiovascular


Rhythm: Regular


Heart sounds: Normal auscultation


Murmur: No





- Abdominal


Inspection: Normal, Gravid female


Distension: No distension


Bowel sounds: Normal


Tenderness: Nontender


Organomegaly: No organomegaly





- Back


Back: Normal, Nontender





- Extremities


General upper extremity: Normal inspection, Nontender, Normal color, Normal ROM

, Normal temperature


General lower extremity: Normal inspection, Nontender, Normal color, Normal ROM

, Normal temperature, Normal weight bearing.  No: Debora's sign





- Neurological


Neuro grossly intact: Yes


Cognition: Normal


Orientation: AAOx4


Hatfield Coma Scale Eye Opening: Spontaneous


Hatfield Coma Scale Verbal: Oriented


Hatfield Coma Scale Motor: Obeys Commands


Hatfield Coma Scale Total: 15


Speech: Normal


Motor strength normal: LUE, RUE, LLE, RLE


Sensory: Normal





- Psychological


Associated symptoms: Normal affect, Normal mood





- Skin


Skin Temperature: Warm


Skin Moisture: Dry


Skin Color: Normal





<EFREN PEREZ - Last Filed: 18 05:07>





- Vital signs


Vitals: 


 











Temp Pulse Resp BP Pulse Ox


 


 99.1 F   87   18   126/74 H  99 


 


 09/10/18 17:58  09/10/18 17:58  09/10/18 17:58  09/10/18 17:58  09/10/18 17:58














Course





- Laboratory


Result Diagrams: 


 09/10/18 18:14





 09/10/18 18:14





<ALFONSO QUESADA - Last Filed: 09/10/18 20:32>





- Laboratory


Result Diagrams: 


 18 03:27





 09/10/18 18:14





<EFREN PEREZ - Last Filed: 18 05:07>





- Re-evaluation


Re-evalutation: 





09/10/18 18:28


Consulted Dr. Loya. Stated she will call back due to her being in labour and 

delivery. 





09/10/18 20:32


Dr. Loya called back stating she just got out of delivery and will be 

placing patient in post-partum.  (ALFONSO QUESADA)








Patient is a 42-year-old female approximately 18-1/2 weeks pregnant who comes 

in after water broke and patient was having vaginal bleeding at home.  Patient 

was examined by OB/GYN.  She appears to be in  labor.  Patient has a 

large fibroid uterus which is likely preventing any further intrauterine 

growth.  Blood work and vitals within normal limits.


At approximately 2150, called to room by RN the patient was on the commode and 

heavily bleeding.  Patient stated that she felt like she had to pee felt 

something pass from her vagina and then felt something larger passed.  Fetus 

noted to be in commode after spontaneous premature delivery.  At 18 weeks, this 

is previable.  Patient was brought to bed and is having heavy bleeding.  

Uterine massage performed.  OB/GYN called and patient will be taken upstairs 

for further evaluation.  Pitocin can be started upstairs.  Attempted to  

placenta but did not pass.  Did not wanted to rip so left forceps in place as 

instructed by OB/GYN.  Fetus placed in specimen container and taken to labor 

and delivery by ER nurse.


Patient had been typed and screened.  2 units of blood ordered due to heavy 

bleeding.


 (EFREN PEREZ)





- Vital Signs


Vital signs: 


 











Temp Pulse Resp BP Pulse Ox


 


 98.5 F   102 H  17   127/71 H  99 


 


 18 02:40  18 02:40  18 02:40  18 02:40  18 02:40














- Laboratory


Laboratory results interpreted by me: 


 











  09/10/18 09/10/18 09/10/18





  18:14 18:14 18:14


 


RBC  3.71 L  


 


Hgb  10.5 L  


 


Hct  31.2 L  


 


RDW  14.6 H  


 


Sodium   136.3 L 


 


Chloride   108 H 


 


BUN   5 L 


 


Albumin   3.2 L 


 


Crossmatch    See Detail














Critical Care Note





- Critical Care Note


Total time excluding time spent on procedures (mins): 75 - Evaluation and 

management of bleeding pregnant patient, management of postpartum bleeding, 

consultation with OB/GYN, coordination of admission





<EFREN PEREZ - Last Filed: 18 05:07>





Discharge





<ALFONSO QUESADA - Last Filed: 09/10/18 20:32>





- Discharge


[facility]mitting Provider: [facility]


Unit Admitted: Post Partum





<EFREN PEREZ - Last Filed: 18 05:07>





- Discharge


Clinical Impression: 


  premature rupture of membranes (PPROM) delivered, current 

hospitalization, Spontaneous vaginal delivery, previable fetus





Condition: Stable


Disposition: ADMITTED AS OBSERVATION


Scribe Attestation: 





18 05:06


I personally performed the services described in the documentation, reviewed 

and edited the documentation which was dictated to the scribe in my presence, 

and it accurately records my words and actions. (EFREN PEREZ)





Scribe Documentation





- Scribe


Written by Scribe:: Mine Patel, 9/10/2018 18:55


acting as scribe for :: Charbel





<ALFONSO QUESADA - Last Filed: 09/10/18 20:32>

## 2018-09-11 VITALS — SYSTOLIC BLOOD PRESSURE: 116 MMHG | DIASTOLIC BLOOD PRESSURE: 69 MMHG

## 2018-09-11 LAB
ADD MANUAL DIFF: NO
BASOPHILS # BLD AUTO: 0.1 10^3/UL (ref 0–0.2)
BASOPHILS NFR BLD AUTO: 0.6 % (ref 0–2)
EOSINOPHIL # BLD AUTO: 0.1 10^3/UL (ref 0–0.6)
EOSINOPHIL NFR BLD AUTO: 1.3 % (ref 0–6)
ERYTHROCYTE [DISTWIDTH] IN BLOOD BY AUTOMATED COUNT: 14.4 % (ref 11.5–14)
HCT VFR BLD CALC: 24.1 % (ref 36–47)
HGB BLD-MCNC: 8.2 G/DL (ref 12–15.5)
LYMPHOCYTES # BLD AUTO: 1.3 10^3/UL (ref 0.5–4.7)
LYMPHOCYTES NFR BLD AUTO: 13 % (ref 13–45)
MCH RBC QN AUTO: 28.2 PG (ref 27–33.4)
MCHC RBC AUTO-ENTMCNC: 33.9 G/DL (ref 32–36)
MCV RBC AUTO: 83 FL (ref 80–97)
MONOCYTES # BLD AUTO: 0.7 10^3/UL (ref 0.1–1.4)
MONOCYTES NFR BLD AUTO: 7.1 % (ref 3–13)
NEUTROPHILS # BLD AUTO: 7.6 10^3/UL (ref 1.7–8.2)
NEUTS SEG NFR BLD AUTO: 78 % (ref 42–78)
PLATELET # BLD: 335 10^3/UL (ref 150–450)
RBC # BLD AUTO: 2.91 10^6/UL (ref 3.72–5.28)
TOTAL CELLS COUNTED % (AUTO): 100 %
WBC # BLD AUTO: 9.8 10^3/UL (ref 4–10.5)

## 2018-09-11 PROCEDURE — 10D17Z9 MANUAL EXTRACTION OF PRODUCTS OF CONCEPTION, RETAINED, VIA NATURAL OR ARTIFICIAL OPENING: ICD-10-PCS | Performed by: OBSTETRICS & GYNECOLOGY

## 2018-09-11 RX ADMIN — SODIUM CHLORIDE, SODIUM LACTATE, POTASSIUM CHLORIDE, AND CALCIUM CHLORIDE PRN MLS/HR: .6; .31; .03; .02 INJECTION, SOLUTION INTRAVENOUS at 04:24

## 2018-09-11 NOTE — PDOC H&P
History of Present Illness


Admission Date/PCP: 


09/10/18


Patient complains of: Rupture of Membranes at 18-5/7 weeks EGA


History of Present Illness: 


EUSEBIO FLOWERS is a 42 year old female with an IUP at 18-5/7 weeks presented 

to the ED via ambulance after stating that her water broke and that she was 

bleeding.  She stated that she was having intercourse when it occurred.  She 

denied any contractions upon arrival.  At the bedside, she is grossly ruptured 

with minimal blood noted.  bedside US showed a live, pre-viable fetus in a 

vertex position.  She later delivered the fetus in the ED











Past Medical History


Gynecological Infection: No





Past Surgical History


Past Surgical History: Reports: None





Social History


Lives with: Friend


Smoking Status: Unknown if Ever Smoked


Hx Recreational Drug Use: No


Drugs: None


Hx Prescription Drug Abuse: No





Family History


Family History: Reviewed & Not Pertinent


Parental Family History Reviewed: Yes


Children Family History Reviewed: Yes


Sibling(s) Family History Reviewed.: Yes





Medication/Allergy


Home Medications: 








Folic Acid 1 tab PO DAILY 09/10/18 


Prenatal Vit/Dha [Prenatal Multi + Dha Capsule] 1 cap PO DAILY 09/10/18 








Allergies/Adverse Reactions: 


 





No Known Allergies Allergy (Verified 17 19:01)


 











Review of Systems


Constitutional: ABSENT: chills, fever(s), headache(s), weight gain, weight loss


Eyes: ABSENT: visual disturbances


Ears: ABSENT: hearing changes


Cardiovascular: ABSENT: chest pain, dyspnea on exertion, edema, orthropnea, 

palpitations


Respiratory: ABSENT: cough, hemoptysis


Gastrointestinal: ABSENT: abdominal pain, constipation, diarrhea, hematemesis, 

hematochezia, nausea, vomiting


Genitourinary: ABSENT: dysuria, hematuria


Musculoskeletal: ABSENT: joint swelling


Integumentary: ABSENT: rash, wounds


Neurological: ABSENT: abnormal gait, abnormal speech, confusion, dizziness, 

focal weakness, syncope


Psychiatric: ABSENT: anxiety, depression, homidical ideation, suicidal ideation


Endocrine: ABSENT: cold intolerance, heat intolerance, polydipsia, polyuria


Hematologic/Lymphatic: ABSENT: easy bleeding, easy bruising





Physical Exam





- Physical Exam


Vital Signs: 


 











Temp Pulse Resp BP Pulse Ox


 


 98.7 F   79   20   122/69   99 


 


 09/10/18 23:11  09/10/18 23:30  09/10/18 23:11  09/10/18 23:30  09/10/18 23:30








 Intake & Output











 09/09/18 09/10/18 09/11/18





 06:59 06:59 06:59


 


Weight   89 kg














- Obstetrical Exam


Vagina: normal


Dilation (cm): 2


Effacement (%): 50


Fetal Station: -3


Tender: No


Adhexa: normal





Assessment & Plan





- Diagnosis


(1) Premature rupture of membranes


Is this a current diagnosis for this admission?: Yes   





(2) Antepartum anemia complicating pregnancy in second trimester


Is this a current diagnosis for this admission?: Yes   





- Plan Summary


Plan Summary: 


Assesstmet:


1.  IUP @ 18-5/7 weeks


2.  PPROM


3.   of demised, pre-viable fetus in ED


4. anemia


5.  heavy bleeding





Plan:


1. Transfer to  for delivery of placenta


2.  Cytotec 800 ug per rectum


3.  Consider transfusion

## 2018-09-11 NOTE — PDOC DISCHARGE SUMMARY
Final Diagnosis


Discharge Date: 18





- Final Diagnosis


(1) Premature rupture of membranes


Is this a current diagnosis for this admission?: Yes   





(2) Antepartum anemia complicating pregnancy in second trimester


Is this a current diagnosis for this admission?: Yes   





Discharge Data





- Discharge Medication


Prescriptions: 


Ferrous Sulfate 325 mg PO AC 30 Days #30 tablet


Ibuprofen 800 mg PO Q8 #30 tablet


Home Medications: 








Folic Acid 1 tab PO DAILY 09/10/18 


Prenatal Vit/Dha [Prenatal Multi + Dha Capsule] 1 cap PO DAILY 09/10/18 


Ferrous Sulfate 325 mg PO AC 30 Days #30 tablet 18 


Ibuprofen 800 mg PO Q8 #30 tablet 18 








Gestational Age: 18-5/7 wks


Reason(s) for Admission: Spontaneous 


Prenatal Procedures: None


Prenatal Procedure(s) Note: 


Pt delivered spontaneously, vaginally in the ED





Intrapartum Procedure(s): Spontaneous Vaginal Delivery


Intrapartum Procedure Note: 


N/A





Postpartum Complication(s) Note: 


Prolonged delivery of placenta w/manual extraction








- Diagnosis Test


Laboratory: 


 











Temp Pulse Resp BP Pulse Ox


 


 98.5 F   102 H  18   114/64   100 


 


 18 04:14  18 04:18 04:14  18 04:18 04:18





  03:27


 


RBC  2.91 L


 


Hgb  8.2 L D


 


Hct  24.1 L














- Discharge information/Instructions


Discharge Activity: Activity As Tolerated


Discharge Diet: As Tolerated, Regular


Disposition: HOME, SELF-CARE


Follow up with: [facility]


in: 4 - or sooner if needed, Weeks

## 2018-09-11 NOTE — OPERATIVE REPORT
Operative Report


DATE OF SURGERY: 09/11/18


PREOPERATIVE DIAGNOSIS: 1. s/p Spontaneous Vaginal Delivery of Pre-viable 

Fetus.  2. Delayed Delivery of Placenta.  3. Postpartum Hemorrhage


POSTOPERATIVE DIAGNOSIS: same


OPERATION: Manual Extraction of placenta


SURGEON: LIVIA MOORE


TISSUE REMOVED OR ALTERED: Placenta


COMPLICATIONS: 





none


ESTIMATED BLOOD LOSS: 1000 ml


PROCEDURE: 


Patient delivered the fetus in the ED at approx 2100.  Large amount of bleeding 

reported by the ED. Pt arrived at L&D approx 2230 with the cord clamped with a 

sponge stick. At least (2) chux filled with blood.  Cytotec 800 ug placed per 

rectum.  Bleeding manageable.  Pt was antonia severely and given Nubain.  

Bleeding started again, therefore I performed a Sterile Speculum Exam and I 

extracted the placenta using a sponge stick and fundal massage.  Pitocin bolus 

in progress.  Bleeding then minimized.  Pitocin continued.

## 2018-09-11 NOTE — PDOC PROGRESS REPORT
Subjective


Progress Note for:: 18


Subjective:: 


Pt states that she feels good; decreasing lochia; Pt denies CP, N/V, SOB and F/

C.  She is ambulating and voiding w/o difficulty





Reason For Visit: 


PREGNANCY/SPONTANEOUS 








Physical Exam





- Physical Exam


Vital Signs: 


 











Temp Pulse Resp BP Pulse Ox


 


 98.5 F   102 H  18   114/64   100 


 


 18 04:14  18 04:14  18 04:14  18 04:14  18 04:14








 Intake & Output











 09/10/18 09/11/18 09/12/18





 06:59 06:59 06:59


 


Intake Total  800 


 


Balance  800 


 


Weight  89 kg 











General appearance: PRESENT: no acute distress


Respiratory exam: PRESENT: clear to auscultation zhen


Cardiovascular exam: PRESENT: RRR


GI/Abdominal exam: PRESENT: normal bowel sounds, soft





- Gynecological Exam


Vagina: other - scant vaginal bleeding





- Obstetrical Exam


Vagina: normal


Fetal Station: -4


Adhexa: normal





Result


Laboratory Results: 


 





 18 03:27 





 











  18





  03:27


 


WBC  9.8


 


RBC  2.91 L


 


Hgb  8.2 L D


 


Hct  24.1 L


 


MCV  83


 


MCH  28.2


 


MCHC  33.9


 


RDW  14.4 H


 


Plt Count  335


 


Seg Neutrophils %  78.0


 


Lymphocytes %  13.0


 


Monocytes %  7.1


 


Eosinophils %  1.3


 


Basophils %  0.6


 


Absolute Neutrophils  7.6


 


Absolute Lymphocytes  1.3


 


Absolute Monocytes  0.7


 


Absolute Eosinophils  0.1


 


Absolute Basophils  0.1














Assessment & Plan





- Diagnosis


(1) Premature rupture of membranes


Is this a current diagnosis for this admission?: Yes   





(2) Antepartum anemia complicating pregnancy in second trimester


Is this a current diagnosis for this admission?: Yes   





- Plan Summary


Plan Summary: 





Plan:


1.  Discharge home today


2.  F/U in the office in 4 weeks for Postpartum exam


3.  Notify the office if signs/symptoms of Depression


4.  Rx FeSO4, Ibuprofen

## 2018-11-30 ENCOUNTER — HOSPITAL ENCOUNTER (OUTPATIENT)
Dept: HOSPITAL 62 - II | Age: 43
Discharge: HOME | End: 2018-11-30
Attending: INTERNAL MEDICINE
Payer: MEDICAID

## 2018-11-30 VITALS — DIASTOLIC BLOOD PRESSURE: 82 MMHG | SYSTOLIC BLOOD PRESSURE: 162 MMHG

## 2018-11-30 DIAGNOSIS — K90.9: ICD-10-CM

## 2018-11-30 DIAGNOSIS — D50.8: Primary | ICD-10-CM

## 2018-11-30 PROCEDURE — 96367 TX/PROPH/DG ADDL SEQ IV INF: CPT

## 2018-12-18 ENCOUNTER — HOSPITAL ENCOUNTER (OUTPATIENT)
Dept: HOSPITAL 62 - II | Age: 43
Discharge: HOME | End: 2018-12-18
Attending: INTERNAL MEDICINE
Payer: MEDICAID

## 2018-12-18 VITALS — DIASTOLIC BLOOD PRESSURE: 72 MMHG | SYSTOLIC BLOOD PRESSURE: 127 MMHG

## 2018-12-18 DIAGNOSIS — D50.8: Primary | ICD-10-CM

## 2018-12-18 DIAGNOSIS — K90.9: ICD-10-CM

## 2018-12-18 PROCEDURE — 96367 TX/PROPH/DG ADDL SEQ IV INF: CPT

## 2018-12-18 PROCEDURE — 96365 THER/PROPH/DIAG IV INF INIT: CPT

## 2018-12-18 PROCEDURE — 3E033GC INTRODUCTION OF OTHER THERAPEUTIC SUBSTANCE INTO PERIPHERAL VEIN, PERCUTANEOUS APPROACH: ICD-10-PCS | Performed by: INTERNAL MEDICINE

## 2018-12-27 ENCOUNTER — HOSPITAL ENCOUNTER (OUTPATIENT)
Dept: HOSPITAL 62 - 5TH | Age: 43
Discharge: HOME | End: 2018-12-27
Attending: INTERNAL MEDICINE
Payer: MEDICAID

## 2018-12-27 VITALS — DIASTOLIC BLOOD PRESSURE: 64 MMHG | SYSTOLIC BLOOD PRESSURE: 117 MMHG

## 2018-12-27 DIAGNOSIS — K90.9: ICD-10-CM

## 2018-12-27 DIAGNOSIS — D50.8: Primary | ICD-10-CM

## 2018-12-27 PROCEDURE — 3E033GC INTRODUCTION OF OTHER THERAPEUTIC SUBSTANCE INTO PERIPHERAL VEIN, PERCUTANEOUS APPROACH: ICD-10-PCS | Performed by: INTERNAL MEDICINE

## 2018-12-27 PROCEDURE — 96365 THER/PROPH/DIAG IV INF INIT: CPT

## 2018-12-27 PROCEDURE — 96367 TX/PROPH/DG ADDL SEQ IV INF: CPT
